# Patient Record
Sex: MALE | Race: WHITE | NOT HISPANIC OR LATINO | Employment: FULL TIME | URBAN - METROPOLITAN AREA
[De-identification: names, ages, dates, MRNs, and addresses within clinical notes are randomized per-mention and may not be internally consistent; named-entity substitution may affect disease eponyms.]

---

## 2023-11-16 ENCOUNTER — LAB (OUTPATIENT)
Dept: LAB | Facility: HOSPITAL | Age: 53
End: 2023-11-16
Payer: COMMERCIAL

## 2023-11-16 ENCOUNTER — OFFICE VISIT (OUTPATIENT)
Dept: FAMILY MEDICINE CLINIC | Facility: CLINIC | Age: 53
End: 2023-11-16
Payer: COMMERCIAL

## 2023-11-16 VITALS
HEIGHT: 73 IN | BODY MASS INDEX: 30.59 KG/M2 | OXYGEN SATURATION: 97 % | DIASTOLIC BLOOD PRESSURE: 79 MMHG | TEMPERATURE: 98.2 F | HEART RATE: 72 BPM | SYSTOLIC BLOOD PRESSURE: 128 MMHG | WEIGHT: 230.8 LBS

## 2023-11-16 DIAGNOSIS — Z00.00 ANNUAL PHYSICAL EXAM: ICD-10-CM

## 2023-11-16 DIAGNOSIS — E55.9 VITAMIN D DEFICIENCY: ICD-10-CM

## 2023-11-16 DIAGNOSIS — Z12.5 SCREENING FOR MALIGNANT NEOPLASM OF PROSTATE: ICD-10-CM

## 2023-11-16 DIAGNOSIS — R53.82 CHRONIC FATIGUE: ICD-10-CM

## 2023-11-16 DIAGNOSIS — Z00.00 ENCOUNTER FOR MEDICAL EXAMINATION TO ESTABLISH CARE: Primary | ICD-10-CM

## 2023-11-16 DIAGNOSIS — R41.3 MEMORY CHANGE: ICD-10-CM

## 2023-11-16 LAB
25(OH)D3 SERPL-MCNC: 28.1 NG/ML (ref 30–100)
ALBUMIN SERPL-MCNC: 4.2 G/DL (ref 3.5–5.2)
ALBUMIN/GLOB SERPL: 1.7 G/DL
ALP SERPL-CCNC: 54 U/L (ref 39–117)
ALT SERPL W P-5'-P-CCNC: 20 U/L (ref 1–41)
ANION GAP SERPL CALCULATED.3IONS-SCNC: 9.5 MMOL/L (ref 5–15)
ANISOCYTOSIS BLD QL: ABNORMAL
AST SERPL-CCNC: 26 U/L (ref 1–40)
BILIRUB SERPL-MCNC: 0.3 MG/DL (ref 0–1.2)
BUN SERPL-MCNC: 22 MG/DL (ref 6–20)
BUN/CREAT SERPL: 22 (ref 7–25)
CALCIUM SPEC-SCNC: 9.3 MG/DL (ref 8.6–10.5)
CHLORIDE SERPL-SCNC: 104 MMOL/L (ref 98–107)
CHOLEST SERPL-MCNC: 137 MG/DL (ref 0–200)
CO2 SERPL-SCNC: 23.5 MMOL/L (ref 22–29)
CREAT SERPL-MCNC: 1 MG/DL (ref 0.76–1.27)
DEPRECATED RDW RBC AUTO: 38.3 FL (ref 37–54)
EGFRCR SERPLBLD CKD-EPI 2021: 88.9 ML/MIN/1.73
EOSINOPHIL # BLD MANUAL: 0.5 10*3/MM3 (ref 0–0.4)
EOSINOPHIL NFR BLD MANUAL: 3 % (ref 0.3–6.2)
ERYTHROCYTE [DISTWIDTH] IN BLOOD BY AUTOMATED COUNT: 14.7 % (ref 12.3–15.4)
GLOBULIN UR ELPH-MCNC: 2.5 GM/DL
GLUCOSE SERPL-MCNC: 98 MG/DL (ref 65–99)
HCT VFR BLD AUTO: 38.4 % (ref 37.5–51)
HDLC SERPL-MCNC: 37 MG/DL (ref 40–60)
HGB BLD-MCNC: 11.8 G/DL (ref 13–17.7)
HIV 1+2 AB+HIV1 P24 AG SERPL QL IA: NORMAL
IRON 24H UR-MRATE: 24 MCG/DL (ref 59–158)
LDLC SERPL CALC-MCNC: 87 MG/DL (ref 0–100)
LDLC/HDLC SERPL: 2.37 {RATIO}
LYMPHOCYTES # BLD MANUAL: 9.63 10*3/MM3 (ref 0.7–3.1)
LYMPHOCYTES NFR BLD MANUAL: 4 % (ref 5–12)
MCH RBC QN AUTO: 22.3 PG (ref 26.6–33)
MCHC RBC AUTO-ENTMCNC: 30.7 G/DL (ref 31.5–35.7)
MCV RBC AUTO: 72.7 FL (ref 79–97)
MICROCYTES BLD QL: ABNORMAL
MONOCYTES # BLD: 0.66 10*3/MM3 (ref 0.1–0.9)
NEUTROPHILS # BLD AUTO: 5.81 10*3/MM3 (ref 1.7–7)
NEUTROPHILS NFR BLD MANUAL: 35 % (ref 42.7–76)
OVALOCYTES BLD QL SMEAR: ABNORMAL
PLAT MORPH BLD: NORMAL
PLATELET # BLD AUTO: 246 10*3/MM3 (ref 140–450)
PMV BLD AUTO: 10.6 FL (ref 6–12)
POIKILOCYTOSIS BLD QL SMEAR: ABNORMAL
POTASSIUM SERPL-SCNC: 4.2 MMOL/L (ref 3.5–5.2)
PROT SERPL-MCNC: 6.7 G/DL (ref 6–8.5)
RBC # BLD AUTO: 5.28 10*6/MM3 (ref 4.14–5.8)
SODIUM SERPL-SCNC: 137 MMOL/L (ref 136–145)
SPHEROCYTES BLD QL SMEAR: ABNORMAL
TRIGL SERPL-MCNC: 62 MG/DL (ref 0–150)
TSH SERPL DL<=0.05 MIU/L-ACNC: 1.65 UIU/ML (ref 0.27–4.2)
VARIANT LYMPHS NFR BLD MANUAL: 2 % (ref 0–5)
VARIANT LYMPHS NFR BLD MANUAL: 56 % (ref 19.6–45.3)
VIT B12 BLD-MCNC: 528 PG/ML (ref 211–946)
VLDLC SERPL-MCNC: 13 MG/DL (ref 5–40)
WBC MORPH BLD: NORMAL
WBC NRBC COR # BLD AUTO: 16.61 10*3/MM3 (ref 3.4–10.8)

## 2023-11-16 PROCEDURE — 84403 ASSAY OF TOTAL TESTOSTERONE: CPT

## 2023-11-16 PROCEDURE — 80061 LIPID PANEL: CPT

## 2023-11-16 PROCEDURE — G0432 EIA HIV-1/HIV-2 SCREEN: HCPCS

## 2023-11-16 PROCEDURE — 80050 GENERAL HEALTH PANEL: CPT

## 2023-11-16 PROCEDURE — 82306 VITAMIN D 25 HYDROXY: CPT

## 2023-11-16 PROCEDURE — 84402 ASSAY OF FREE TESTOSTERONE: CPT

## 2023-11-16 PROCEDURE — G0103 PSA SCREENING: HCPCS

## 2023-11-16 PROCEDURE — 82607 VITAMIN B-12: CPT

## 2023-11-16 PROCEDURE — 83540 ASSAY OF IRON: CPT

## 2023-11-16 PROCEDURE — 86592 SYPHILIS TEST NON-TREP QUAL: CPT

## 2023-11-16 PROCEDURE — 36415 COLL VENOUS BLD VENIPUNCTURE: CPT

## 2023-11-16 NOTE — PROGRESS NOTES
Follow Up Office Note     Patient Name: Kojo Gentile  : 1970   MRN: [id number]     Chief Complaint:    Chief Complaint   Patient presents with    Establish Care    Memory Loss     Per patient he is having memory loss.    Annual Exam       History of Present Illness: Kojo Gentile is a 53 y.o. male who presents today accompanied by his significant other to establish care with a new PCP and for annual physical exam.    Patient complaining of significant memory changes over the past month.  His significant other states that she has noticed patient seeming confused at times or in a daze and asking her the same questions repeatedly.  Patient states that he will lose things and sometimes get lost while driving.  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  Patient denies previous health issues.  He states he has been very healthy all of his life.  There is been no recent trauma or injury.  Patient states he has had previous concussions from horse abutting him in the head.  Patient and partner are very concerned about patient's memory changes.  When questioned patient's significant other stated that patient was sick with an acute illness 4 to 6 weeks ago where he was in bed for 3 days and had a high fever.  Patient did not go to the doctor.  He was not tested for COVID-19.    Patient also concerned about unexplained weight loss.  He states that he has a good appetite and eats large portions but has lost 20 pounds over the past month.      Subjective      I have reviewed and the following portions of the patient's history were updated as appropriate: past family history, past medical history, past social history, past surgical history and problem list.    Review of Systems:   Review of Systems   Constitutional:  Positive for activity change, fatigue and unexpected weight change. Negative for appetite change, chills and diaphoresis.   HENT:  "Negative.     Eyes: Negative.    Respiratory: Negative.     Cardiovascular:  Negative for chest pain, palpitations and leg swelling.   Gastrointestinal: Negative.    Genitourinary: Negative.    Musculoskeletal: Negative.    Skin: Negative.    Neurological:  Negative for dizziness, tremors, seizures, syncope, facial asymmetry, speech difficulty, weakness, light-headedness, numbness and headaches.        Past Medical History: History reviewed. No pertinent past medical history.      Medications:   No current outpatient medications on file.    Allergies:   No Known Allergies      Objective     Physical Exam:  Vital Signs:   Vitals:    11/16/23 0819   BP: 128/79   Pulse: 72   Temp: 98.2 °F (36.8 °C)   TempSrc: Infrared   SpO2: 97%   Weight: 105 kg (230 lb 12.8 oz)   Height: 185.4 cm (73\")   PainSc: 0-No pain     Body mass index is 30.45 kg/m².   BMI is >= 30 and <35. (Class 1 Obesity). The following options were offered after discussion;: information on healthy weight added to patient's after visit summary .     Physical Exam  Vitals and nursing note reviewed.   Constitutional:       General: He is not in acute distress.     Appearance: Normal appearance. He is well-developed. He is not ill-appearing, toxic-appearing or diaphoretic.   HENT:      Head: Normocephalic and atraumatic.      Right Ear: Tympanic membrane normal.      Left Ear: Tympanic membrane normal.      Nose: Nose normal.      Mouth/Throat:      Mouth: Mucous membranes are moist.      Pharynx: Oropharynx is clear.   Eyes:      Pupils: Pupils are equal, round, and reactive to light.   Cardiovascular:      Rate and Rhythm: Normal rate and regular rhythm.      Heart sounds: Normal heart sounds, S1 normal and S2 normal. No murmur heard.  Pulmonary:      Effort: Pulmonary effort is normal. No respiratory distress.      Breath sounds: Normal breath sounds. No stridor. No wheezing.   Abdominal:      Palpations: Abdomen is soft.      Tenderness: There is no " abdominal tenderness.   Musculoskeletal:      Cervical back: Normal range of motion and neck supple.      Right lower leg: No edema.      Left lower leg: No edema.   Lymphadenopathy:      Cervical: No cervical adenopathy.   Skin:     General: Skin is warm and dry.   Neurological:      General: No focal deficit present.      Mental Status: He is alert and oriented to person, place, and time.   Psychiatric:         Attention and Perception: Attention and perception normal.         Mood and Affect: Mood normal.         Speech: Speech normal.         Behavior: Behavior normal. Behavior is cooperative.         Thought Content: Thought content normal.         Cognition and Memory: Memory is impaired. He exhibits impaired recent memory.         Judgment: Judgment normal.         Assessment / Plan      Assessment/Plan:   Diagnoses and all orders for this visit:    1. Encounter for medical examination to establish care (Primary)    2. Annual physical exam  -     Comprehensive Metabolic Panel; Future  -     CBC Auto Differential; Future  -     Lipid Panel; Future  -     TSH; Future  -     HIV-1 / O / 2 Ag / Antibody; Future  -     Urinalysis With Culture If Indicated - Urine, Clean Catch; Future    3. Memory change  Assessment & Plan:  Newly identified problem of uncertain etiology.  The rapid onset of symptoms within 4-week period is concerning.  Plan to obtain laboratory studies and diagnostic imaging.  Further recommendation after results evaluation.  Plan to refer to neurology for further evaluation and management.    ACE III MMSE    ATTENTION  What is the year: correct  What is the month of the year: correct  What is the day of the week?: correct  What is the date?: incorrect  MEMORY  Repeat address three times, only score third attempt: Chava Pena [address] [place], [place]: 4  HOW MANY ANIMALS DID THE PATIENT NAME  Verbal Fluency -- Animal Names (0-25): 11-13  CLOCK DRAWING  Clock Drawing: Clock  Hands  MEMORY RECALL  Tell me what you remember about that name and address we were repeating at the beginning: 3  ACE TOTAL SCORE  Total ACE Score - <25/30 strongly suggests cognitive impairment; <21/30 almost certainly shows dementia: 16    Orders:  -     TSH; Future  -     Testosterone, Free, Total; Future  -     RPR; Future  -     CT Head Without Contrast; Future  -     Ambulatory Referral to Neurology    4. Chronic fatigue  -     TSH; Future  -     Testosterone, Free, Total; Future  -     Vitamin B12; Future  -     Iron; Future    5. Vitamin D deficiency  -     Vitamin D,25-Hydroxy; Future    6. Screening for malignant neoplasm of prostate        -     PSA     The patient is here for a health maintenance visit.  Currently, the patient consumes a mostly healthy diet and has an adequate exercise regimen. Screening lab work is ordered.  Immunizations are declined today and vaccine education is provided.  Advice and education is given regarding nutrition, aerobic exercise, routine dental evaluations, routine eye exams, reproductive health, cardiovascular risk reduction, sunscreen use, self skin examination (annual dermatology evaluations) and seat belt use (general overall safety).  Further recommendations after lab evaluation.  Annual wellness evaluations recommended.     Follow Up:   PRN and at next scheduled appointment(s) with PCP.    Discussed the nature of the medical condition(s) risks, complications, implications, management, safe and proper use of medications. Encouraged medication compliance, and keeping scheduled follow up appointments with me and any other providers.      RTC if symptoms fail to improve, to ER if symptoms worsen.        *Dictated Utilizing Dragon Dictation   Please note that portions of this note were completed with a voice recognition program.   Part of this note may be an electronic transcription/translation of spoken language to printed text using the Dragon Dictation System.  Spelling and/or grammatical errors may exist despite efforts at proofreading.      NOTE TO PATIENT: The 21st Century Cures Act makes medical notes like these available to patients in the interest of transparency. However, be advised this is a medical document. It is intended as peer to peer communication. It is written in medical language and may contain abbreviations or verbiage that are unfamiliar. It may appear blunt or direct. Medical documents are intended to carry relevant information, facts as evident, and the clinical opinion of the practitioner.      ANGEL Stanley  [facility] [place]

## 2023-11-17 ENCOUNTER — TELEPHONE (OUTPATIENT)
Dept: FAMILY MEDICINE CLINIC | Facility: CLINIC | Age: 53
End: 2023-11-17
Payer: COMMERCIAL

## 2023-11-17 DIAGNOSIS — R53.82 CHRONIC FATIGUE: ICD-10-CM

## 2023-11-17 DIAGNOSIS — D72.820 LYMPHOCYTOSIS: ICD-10-CM

## 2023-11-17 DIAGNOSIS — R41.3 MEMORY CHANGE: Primary | ICD-10-CM

## 2023-11-17 LAB
PSA SERPL-MCNC: 2.02 NG/ML (ref 0–4)
RPR SER QL: NORMAL

## 2023-11-17 NOTE — TELEPHONE ENCOUNTER
Patient advised as provider requested.           Per Izzy-Please call patient. His white blood cell count was elevated. In particular the lymphocytes. These are usually elevated in viral infections. I consulted with Dr. Pena about this and he advised patient come to lab today for more labs.

## 2023-11-17 NOTE — ASSESSMENT & PLAN NOTE
Newly identified problem of uncertain etiology.  The rapid onset of symptoms within 4-week period is concerning.  Plan to obtain laboratory studies and diagnostic imaging.  Further recommendation after results evaluation.  Plan to refer to neurology for further evaluation and management.

## 2023-11-20 ENCOUNTER — LAB (OUTPATIENT)
Dept: LAB | Facility: HOSPITAL | Age: 53
End: 2023-11-20
Payer: COMMERCIAL

## 2023-11-20 DIAGNOSIS — R53.82 CHRONIC FATIGUE: ICD-10-CM

## 2023-11-20 DIAGNOSIS — Z00.00 ANNUAL PHYSICAL EXAM: ICD-10-CM

## 2023-11-20 DIAGNOSIS — D72.820 LYMPHOCYTOSIS: ICD-10-CM

## 2023-11-20 DIAGNOSIS — R41.3 MEMORY CHANGE: ICD-10-CM

## 2023-11-20 LAB
ANISOCYTOSIS BLD QL: ABNORMAL
BASOPHILS # BLD MANUAL: 0.15 10*3/MM3 (ref 0–0.2)
BASOPHILS NFR BLD MANUAL: 1 % (ref 0–1.5)
DEPRECATED RDW RBC AUTO: 35.8 FL (ref 37–54)
EOSINOPHIL # BLD MANUAL: 0.29 10*3/MM3 (ref 0–0.4)
EOSINOPHIL NFR BLD MANUAL: 2 % (ref 0.3–6.2)
ERYTHROCYTE [DISTWIDTH] IN BLOOD BY AUTOMATED COUNT: 14.3 % (ref 12.3–15.4)
HCT VFR BLD AUTO: 38.3 % (ref 37.5–51)
HGB BLD-MCNC: 11.7 G/DL (ref 13–17.7)
LYMPHOCYTES # BLD MANUAL: 8.93 10*3/MM3 (ref 0.7–3.1)
LYMPHOCYTES NFR BLD MANUAL: 8.1 % (ref 5–12)
MCH RBC QN AUTO: 21.9 PG (ref 26.6–33)
MCHC RBC AUTO-ENTMCNC: 30.5 G/DL (ref 31.5–35.7)
MCV RBC AUTO: 71.6 FL (ref 79–97)
MICROCYTES BLD QL: ABNORMAL
MONOCYTES # BLD: 1.19 10*3/MM3 (ref 0.1–0.9)
NEUTROPHILS # BLD AUTO: 4.17 10*3/MM3 (ref 1.7–7)
NEUTROPHILS NFR BLD MANUAL: 28.3 % (ref 42.7–76)
NRBC BLD AUTO-RTO: 0 /100 WBC (ref 0–0.2)
PATHOLOGY REVIEW: YES
PLAT MORPH BLD: NORMAL
PLATELET # BLD AUTO: 259 10*3/MM3 (ref 140–450)
PMV BLD AUTO: 10.4 FL (ref 6–12)
RBC # BLD AUTO: 5.35 10*6/MM3 (ref 4.14–5.8)
SMUDGE CELLS BLD QL SMEAR: ABNORMAL
VARIANT LYMPHS NFR BLD MANUAL: 3 % (ref 0–5)
VARIANT LYMPHS NFR BLD MANUAL: 57.6 % (ref 19.6–45.3)
WBC NRBC COR # BLD AUTO: 15.16 10*3/MM3 (ref 3.4–10.8)

## 2023-11-20 PROCEDURE — 85025 COMPLETE CBC W/AUTO DIFF WBC: CPT

## 2023-11-20 PROCEDURE — 84155 ASSAY OF PROTEIN SERUM: CPT

## 2023-11-20 PROCEDURE — 85007 BL SMEAR W/DIFF WBC COUNT: CPT

## 2023-11-20 PROCEDURE — 84425 ASSAY OF VITAMIN B-1: CPT

## 2023-11-20 PROCEDURE — 86334 IMMUNOFIX E-PHORESIS SERUM: CPT

## 2023-11-20 PROCEDURE — 82784 ASSAY IGA/IGD/IGG/IGM EACH: CPT

## 2023-11-20 PROCEDURE — 84165 PROTEIN E-PHORESIS SERUM: CPT

## 2023-11-21 ENCOUNTER — DOCUMENTATION (OUTPATIENT)
Dept: FAMILY MEDICINE CLINIC | Facility: CLINIC | Age: 53
End: 2023-11-21
Payer: COMMERCIAL

## 2023-11-21 LAB
ALBUMIN SERPL ELPH-MCNC: 3.5 G/DL (ref 2.9–4.4)
ALBUMIN/GLOB SERPL: 1.2 {RATIO} (ref 0.7–1.7)
ALPHA1 GLOB SERPL ELPH-MCNC: 0.3 G/DL (ref 0–0.4)
ALPHA2 GLOB SERPL ELPH-MCNC: 0.7 G/DL (ref 0.4–1)
B-GLOBULIN SERPL ELPH-MCNC: 1 G/DL (ref 0.7–1.3)
CYTO UR: NORMAL
GAMMA GLOB SERPL ELPH-MCNC: 1 G/DL (ref 0.4–1.8)
GLOBULIN SER-MCNC: 3 G/DL (ref 2.2–3.9)
IGA SERPL-MCNC: 141 MG/DL (ref 90–386)
IGG SERPL-MCNC: 1021 MG/DL (ref 603–1613)
IGM SERPL-MCNC: 63 MG/DL (ref 20–172)
INTERPRETATION SERPL IEP-IMP: NORMAL
LAB AP CASE REPORT: NORMAL
LABORATORY COMMENT REPORT: NORMAL
M PROTEIN SERPL ELPH-MCNC: NORMAL G/DL
PATH REPORT.FINAL DX SPEC: NORMAL
PROT SERPL-MCNC: 6.5 G/DL (ref 6–8.5)
TESTOST FREE SERPL-MCNC: 4 PG/ML (ref 7.2–24)
TESTOST SERPL-MCNC: 545 NG/DL (ref 264–916)

## 2023-11-22 DIAGNOSIS — R41.3 MEMORY CHANGE: ICD-10-CM

## 2023-11-22 DIAGNOSIS — D72.820 LYMPHOCYTOSIS: Primary | ICD-10-CM

## 2023-11-22 DIAGNOSIS — R53.82 CHRONIC FATIGUE: ICD-10-CM

## 2023-11-22 LAB — VIT B1 BLD-SCNC: 132.5 NMOL/L (ref 66.5–200)

## 2023-11-27 ENCOUNTER — CONSULT (OUTPATIENT)
Dept: ONCOLOGY | Facility: CLINIC | Age: 53
End: 2023-11-27
Payer: COMMERCIAL

## 2023-11-27 ENCOUNTER — LAB (OUTPATIENT)
Dept: LAB | Facility: HOSPITAL | Age: 53
End: 2023-11-27
Payer: COMMERCIAL

## 2023-11-27 VITALS
TEMPERATURE: 98 F | HEART RATE: 71 BPM | HEIGHT: 73 IN | BODY MASS INDEX: 30.62 KG/M2 | DIASTOLIC BLOOD PRESSURE: 82 MMHG | RESPIRATION RATE: 14 BRPM | SYSTOLIC BLOOD PRESSURE: 138 MMHG | WEIGHT: 231 LBS | OXYGEN SATURATION: 99 %

## 2023-11-27 DIAGNOSIS — D72.820 LYMPHOCYTOSIS: ICD-10-CM

## 2023-11-27 DIAGNOSIS — D72.820 LYMPHOCYTOSIS: Primary | ICD-10-CM

## 2023-11-27 LAB
BASOPHILS # BLD AUTO: 0.08 10*3/MM3 (ref 0–0.2)
BASOPHILS NFR BLD AUTO: 0.5 % (ref 0–1.5)
DEPRECATED RDW RBC AUTO: 40 FL (ref 37–54)
EOSINOPHIL # BLD AUTO: 0.16 10*3/MM3 (ref 0–0.4)
EOSINOPHIL NFR BLD AUTO: 1.1 % (ref 0.3–6.2)
ERYTHROCYTE [DISTWIDTH] IN BLOOD BY AUTOMATED COUNT: 15 % (ref 12.3–15.4)
FERRITIN SERPL-MCNC: 8.23 NG/ML (ref 30–400)
FOLATE SERPL-MCNC: 7.39 NG/ML (ref 4.78–24.2)
HCT VFR BLD AUTO: 36 % (ref 37.5–51)
HGB BLD-MCNC: 10.9 G/DL (ref 13–17.7)
IMM GRANULOCYTES # BLD AUTO: 0.02 10*3/MM3 (ref 0–0.05)
IMM GRANULOCYTES NFR BLD AUTO: 0.1 % (ref 0–0.5)
IRON 24H UR-MRATE: 20 MCG/DL (ref 59–158)
IRON SATN MFR SERPL: 4 % (ref 20–50)
LDH SERPL-CCNC: 180 U/L (ref 135–225)
LYMPHOCYTES # BLD AUTO: 8.67 10*3/MM3 (ref 0.7–3.1)
LYMPHOCYTES NFR BLD AUTO: 57 % (ref 19.6–45.3)
MCH RBC QN AUTO: 22.1 PG (ref 26.6–33)
MCHC RBC AUTO-ENTMCNC: 30.3 G/DL (ref 31.5–35.7)
MCV RBC AUTO: 73 FL (ref 79–97)
MONOCYTES # BLD AUTO: 1.49 10*3/MM3 (ref 0.1–0.9)
MONOCYTES NFR BLD AUTO: 9.8 % (ref 5–12)
NEUTROPHILS NFR BLD AUTO: 31.5 % (ref 42.7–76)
NEUTROPHILS NFR BLD AUTO: 4.78 10*3/MM3 (ref 1.7–7)
PLATELET # BLD AUTO: 239 10*3/MM3 (ref 140–450)
PMV BLD AUTO: 10 FL (ref 6–12)
RBC # BLD AUTO: 4.93 10*6/MM3 (ref 4.14–5.8)
TIBC SERPL-MCNC: 508 MCG/DL (ref 298–536)
TRANSFERRIN SERPL-MCNC: 341 MG/DL (ref 200–360)
VIT B12 BLD-MCNC: 547 PG/ML (ref 211–946)
WBC NRBC COR # BLD AUTO: 15.2 10*3/MM3 (ref 3.4–10.8)

## 2023-11-27 PROCEDURE — 82746 ASSAY OF FOLIC ACID SERUM: CPT

## 2023-11-27 PROCEDURE — 36415 COLL VENOUS BLD VENIPUNCTURE: CPT

## 2023-11-27 PROCEDURE — 83540 ASSAY OF IRON: CPT

## 2023-11-27 PROCEDURE — 82728 ASSAY OF FERRITIN: CPT

## 2023-11-27 PROCEDURE — 85025 COMPLETE CBC W/AUTO DIFF WBC: CPT

## 2023-11-27 PROCEDURE — 82607 VITAMIN B-12: CPT

## 2023-11-27 PROCEDURE — 84466 ASSAY OF TRANSFERRIN: CPT

## 2023-11-27 PROCEDURE — 83615 LACTATE (LD) (LDH) ENZYME: CPT

## 2023-11-27 PROCEDURE — 99204 OFFICE O/P NEW MOD 45 MIN: CPT | Performed by: INTERNAL MEDICINE

## 2023-11-27 NOTE — PROGRESS NOTES
DATE OF CONSULTATION: 11/27/2023    REFERRING PHYSICIAN: Izzy Burk APRN    Dear Izzy Emery APRN  Thank you for asking for my medical advice on this patient. I saw him in the  [place] office on 11/27/2023    REASON FOR CONSULTATION: Lymphocytosis    PROBLEM LIST:   1.  Lymphocytosis:  A.  Presented with fatigue, weakness unexplained weight loss September 2023  B.  CBC checked twice November 2023 revealed white cells of 17,000 and absolute lymphocyte count more than 8000.  2.  Microcytic anemia  3.  Fatigue    HISTORY OF PRESENT ILLNESS: The patient is a very pleasant 53 y.o.  male   who was in his usual state of health until September 2023.  The patient present with fatigue.  Never had this problem before.  This has been gradually getting worse.  2-month ago he had shortness of breath fever and cough.  Never been tested for COVID.  He also had poor appetite with approximately 30 pounds weight loss over the last 3 months.  His fever shortness of breath and poor appetite has always resolved however he continues to have fatigue.  He had blood work done twice that confirmed lymphocytosis.  The patient was referred to me for further recommendations.    SUBJECTIVE: When I saw the patient today he is here with his fiancée.  He is complaining of fatigue.  Denies any bleedings no skin bruises.    Review of Systems   Constitutional:  Positive for fatigue and unexpected weight change.   Eyes: Negative.    Respiratory: Negative.     Cardiovascular: Negative.    Gastrointestinal: Negative.    Endocrine: Negative.    Genitourinary: Negative.    Musculoskeletal: Negative.    Allergic/Immunologic: Negative.    Neurological: Negative.    Hematological: Negative.    Psychiatric/Behavioral:  The patient is nervous/anxious.        No past medical history on file.    Social History     Socioeconomic History    Marital status: Significant Other   Tobacco Use    Smoking status: Never    Smokeless tobacco: Never  "  Vaping Use    Vaping Use: Never used   Substance and Sexual Activity    Alcohol use: Not Currently     Alcohol/week: 1.0 standard drink of alcohol     Types: 1 Shots of liquor per week    Drug use: Yes     Types: Marijuana     Comment: per patient he was a teenager    Sexual activity: Yes     Birth control/protection: Hysterectomy       Family History   Problem Relation Age of Onset    Dementia Mother     Heart disease Father        No past surgical history on file.    No Known Allergies     No current outpatient medications on file.    PHYSICAL EXAMINATION:   /82   Pulse 71   Temp 98 °F (36.7 °C) (Infrared)   Resp 14   Ht 185.4 cm (72.99\")   Wt 105 kg (231 lb)   SpO2 99%   BMI 30.48 kg/m²   Pain Score    11/27/23 1311   PainSc: 0-No pain      ECOG score: 0            ECOG Performance Status: 1 - Symptomatic but completely ambulatory  General Appearance:  alert, cooperative, no apparent distress and appears stated age   Neurologic/Psychiatric: A&O x 3, gait steady, appropriate affect, strength 5/5 in all muscle groups   HEENT:  Normocephalic, without obvious abnormality, mucous membranes moist   Neck: Supple, symmetrical, trachea midline, no adenopathy;  No thyromegaly, masses, or tenderness   Lungs:   Clear to auscultation bilaterally; respirations regular, even, and unlabored bilaterally   Heart:  Regular rate and rhythm, no murmurs appreciated   Abdomen:   Soft, non-tender, non-distended, and no organomegaly   Lymph nodes: No cervical, supraclavicular, inguinal or axillary adenopathy noted   Extremities: Normal, atraumatic; no clubbing, cyanosis, or edema    Skin: No rashes, ulcers, or suspicious lesions noted       Lab on 11/20/2023   Component Date Value Ref Range Status    Pathology Review 11/20/2023 Yes   Final    Vitamin B1, Whole Blood 11/20/2023 132.5  66.5 - 200.0 nmol/L Final    IgG 11/20/2023 1021  603 - 1613 mg/dL Final    IgA 11/20/2023 141  90 - 386 mg/dL Final    IgM 11/20/2023 63  20 " - 172 mg/dL Final    Total Protein 11/20/2023 6.5  6.0 - 8.5 g/dL Final    Albumin 11/20/2023 3.5  2.9 - 4.4 g/dL Final    Alpha-1-Globulin 11/20/2023 0.3  0.0 - 0.4 g/dL Final    Alpha-2-Globulin 11/20/2023 0.7  0.4 - 1.0 g/dL Final    Beta Globulin 11/20/2023 1.0  0.7 - 1.3 g/dL Final    Gamma Globulin 11/20/2023 1.0  0.4 - 1.8 g/dL Final    M-Adria 11/20/2023 Not Observed  Not Observed g/dL Final    Globulin 11/20/2023 3.0  2.2 - 3.9 g/dL Final    A/G Ratio 11/20/2023 1.2  0.7 - 1.7 Final    Immunofixation Reflex, Serum 11/20/2023 Comment:   Final    Presence of monoclonal protein is unclear at this time. Suggest  repeat in 3 to 6 months if clinically indicated.    Please note 11/20/2023 Comment   Final    Protein electrophoresis scan will follow via computer, mail, or   delivery.    WBC 11/20/2023 15.16 (H)  3.40 - 10.80 10*3/mm3 Final    RBC 11/20/2023 5.35  4.14 - 5.80 10*6/mm3 Final    Hemoglobin 11/20/2023 11.7 (L)  13.0 - 17.7 g/dL Final    Hematocrit 11/20/2023 38.3  37.5 - 51.0 % Final    MCV 11/20/2023 71.6 (L)  79.0 - 97.0 fL Final    MCH 11/20/2023 21.9 (L)  26.6 - 33.0 pg Final    MCHC 11/20/2023 30.5 (L)  31.5 - 35.7 g/dL Final    RDW 11/20/2023 14.3  12.3 - 15.4 % Final    RDW-SD 11/20/2023 35.8 (L)  37.0 - 54.0 fl Final    MPV 11/20/2023 10.4  6.0 - 12.0 fL Final    Platelets 11/20/2023 259  140 - 450 10*3/mm3 Final    nRBC 11/20/2023 0.0  0.0 - 0.2 /100 WBC Final    Neutrophil % 11/20/2023 28.3 (L)  42.7 - 76.0 % Final    Lymphocyte % 11/20/2023 57.6 (H)  19.6 - 45.3 % Final    Monocyte % 11/20/2023 8.1  5.0 - 12.0 % Final    Eosinophil % 11/20/2023 2.0  0.3 - 6.2 % Final    Basophil % 11/20/2023 1.0  0.0 - 1.5 % Final    Atypical Lymphocyte % 11/20/2023 3.0  0.0 - 5.0 % Final    Neutrophils Absolute 11/20/2023 4.17  1.70 - 7.00 10*3/mm3 Final    Lymphocytes Absolute 11/20/2023 8.93 (H)  0.70 - 3.10 10*3/mm3 Final    Monocytes Absolute 11/20/2023 1.19 (H)  0.10 - 0.90 10*3/mm3 Final     Eosinophils Absolute 11/20/2023 0.29  0.00 - 0.40 10*3/mm3 Final    Basophils Absolute 11/20/2023 0.15  0.00 - 0.20 10*3/mm3 Final    Anisocytosis 11/20/2023 Slight/1+  None Seen Final    Microcytes 11/20/2023 Slight/1+  None Seen Final    Smudge Cells 11/20/2023 Mod/2+  None Seen Final    Platelet Morphology 11/20/2023 Normal  Normal Final    Final Diagnosis 11/20/2023    Final                    Value:This result contains rich text formatting which cannot be displayed here.    Microscopic Description 11/20/2023    Final                    Value:This result contains rich text formatting which cannot be displayed here.    Case Report 11/20/2023    Final                    Value:Surgical Pathology Report                         Case: [id number]                                  Authorizing Provider:  Izzy Burk APRN     Collected:           11/20/2023 09:07 AM          Ordering Location:     [facility]   Received:            11/20/2023 06:44 PM                                 [employer] [facility]                                                     Pathologist:           Barrett Schulte MD                                                         Specimen:    Blood, Venous Line, Peripheral blood smear                                                Lab on 11/16/2023   Component Date Value Ref Range Status    Glucose 11/16/2023 98  65 - 99 mg/dL Final    BUN 11/16/2023 22 (H)  6 - 20 mg/dL Final    Creatinine 11/16/2023 1.00  0.76 - 1.27 mg/dL Final    Sodium 11/16/2023 137  136 - 145 mmol/L Final    Potassium 11/16/2023 4.2  3.5 - 5.2 mmol/L Final    Chloride 11/16/2023 104  98 - 107 mmol/L Final    CO2 11/16/2023 23.5  22.0 - 29.0 mmol/L Final    Calcium 11/16/2023 9.3  8.6 - 10.5 mg/dL Final    Total Protein 11/16/2023 6.7  6.0 - 8.5 g/dL Final    Albumin 11/16/2023 4.2  3.5 - 5.2 g/dL Final    ALT (SGPT) 11/16/2023 20  1 - 41 U/L Final    AST (SGOT) 11/16/2023 26  1 - 40 U/L Final    Alkaline  Phosphatase 11/16/2023 54  39 - 117 U/L Final    Total Bilirubin 11/16/2023 0.3  0.0 - 1.2 mg/dL Final    Globulin 11/16/2023 2.5  gm/dL Final    A/G Ratio 11/16/2023 1.7  g/dL Final    BUN/Creatinine Ratio 11/16/2023 22.0  7.0 - 25.0 Final    Anion Gap 11/16/2023 9.5  5.0 - 15.0 mmol/L Final    eGFR 11/16/2023 88.9  >60.0 mL/min/1.73 Final    WBC 11/16/2023 16.61 (H)  3.40 - 10.80 10*3/mm3 Final    RBC 11/16/2023 5.28  4.14 - 5.80 10*6/mm3 Final    Hemoglobin 11/16/2023 11.8 (L)  13.0 - 17.7 g/dL Final    Hematocrit 11/16/2023 38.4  37.5 - 51.0 % Final    MCV 11/16/2023 72.7 (L)  79.0 - 97.0 fL Final    MCH 11/16/2023 22.3 (L)  26.6 - 33.0 pg Final    MCHC 11/16/2023 30.7 (L)  31.5 - 35.7 g/dL Final    RDW 11/16/2023 14.7  12.3 - 15.4 % Final    RDW-SD 11/16/2023 38.3  37.0 - 54.0 fl Final    MPV 11/16/2023 10.6  6.0 - 12.0 fL Final    Platelets 11/16/2023 246  140 - 450 10*3/mm3 Final    Total Cholesterol 11/16/2023 137  0 - 200 mg/dL Final    Triglycerides 11/16/2023 62  0 - 150 mg/dL Final    HDL Cholesterol 11/16/2023 37 (L)  40 - 60 mg/dL Final    LDL Cholesterol  11/16/2023 87  0 - 100 mg/dL Final    VLDL Cholesterol 11/16/2023 13  5 - 40 mg/dL Final    LDL/HDL Ratio 11/16/2023 2.37   Final    TSH 11/16/2023 1.650  0.270 - 4.200 uIU/mL Final    Testosterone, Total 11/16/2023 545  264 - 916 ng/dL Final    Adult male reference interval is based on a population of  healthy nonobese males (BMI <30) between 19 and 39 years old.  Negrito, et.al. JCEM 2017,102;1828-1711. PMID: [id number].    Testosterone, Free 11/16/2023 4.0 (L)  7.2 - 24.0 pg/mL Final    RPR 11/16/2023 Non-Reactive  Non-Reactive Final    HIV DUO 11/16/2023 Non-Reactive  Non-Reactive Final    Vitamin B-12 11/16/2023 528  211 - 946 pg/mL Final    Iron 11/16/2023 24 (L)  59 - 158 mcg/dL Final    25 Hydroxy, Vitamin D 11/16/2023 28.1 (L)  30.0 - 100.0 ng/ml Final    Neutrophil % 11/16/2023 35.0 (L)  42.7 - 76.0 % Final    Lymphocyte % 11/16/2023 56.0  (H)  19.6 - 45.3 % Final    Monocyte % 11/16/2023 4.0 (L)  5.0 - 12.0 % Final    Eosinophil % 11/16/2023 3.0  0.3 - 6.2 % Final    Atypical Lymphocyte % 11/16/2023 2.0  0.0 - 5.0 % Final    Neutrophils Absolute 11/16/2023 5.81  1.70 - 7.00 10*3/mm3 Final    Lymphocytes Absolute 11/16/2023 9.63 (H)  0.70 - 3.10 10*3/mm3 Final    Monocytes Absolute 11/16/2023 0.66  0.10 - 0.90 10*3/mm3 Final    Eosinophils Absolute 11/16/2023 0.50 (H)  0.00 - 0.40 10*3/mm3 Final    Anisocytosis 11/16/2023 Slight/1+  None Seen Final    Microcytes 11/16/2023 Slight/1+  None Seen Final    Ovalocytes 11/16/2023 Mod/2+  None Seen Final    Poikilocytes 11/16/2023 Mod/2+  None Seen Final    Spherocytes 11/16/2023 Slight/1+  None Seen Final    WBC Morphology 11/16/2023 Normal  Normal Final    Platelet Morphology 11/16/2023 Normal  Normal Final    PSA 11/16/2023 2.020  0.000 - 4.000 ng/mL Final        No results found.      DIAGNOSTIC DATA:   1.  Extensive patient medical records including doctor notes blood results and imaging reports reviewed by me and documented in the patient's chart.    ASSESSMENT: The patient is a very pleasant 53 y.o.  male  with leukocytosis    PLAN:     1.  Lymphocytosis:  A.  I had a long discussion today with the patient and his fiancée about his  new diagnosis of lymphocytosis. I reviewed the patient's documents including refereing provider's notes, lab results, imaging report.   B.  I explained to the patient the current abnormality is worrisome for underlying lymphoproliferative neoplasm.  C.  In order to narrow down the differential diagnosis I will proceed with the following workup: Repeat CBC with differential, peripheral blood flow cytometry, CLL FISH panel, I GVH mutation status, serum LDH.    2.  Microcytic anemia:  A.  I will check the patient's CBC iron profile and vitamin levels today.    3.  Fatigue:  A.  This could be driven by his lymphoproliferative disorder.      FOLLOW UP: 1 month to go over the  results.    Robby Panchal MD  11/27/2023

## 2023-11-28 ENCOUNTER — TELEPHONE (OUTPATIENT)
Dept: ONCOLOGY | Facility: CLINIC | Age: 53
End: 2023-11-28
Payer: COMMERCIAL

## 2023-11-28 DIAGNOSIS — K90.9 IRON MALABSORPTION: ICD-10-CM

## 2023-11-28 DIAGNOSIS — D50.9 IRON DEFICIENCY ANEMIA, UNSPECIFIED IRON DEFICIENCY ANEMIA TYPE: Primary | ICD-10-CM

## 2023-11-28 RX ORDER — SODIUM CHLORIDE 9 MG/ML
20 INJECTION, SOLUTION INTRAVENOUS ONCE
OUTPATIENT
Start: 2023-12-25

## 2023-11-28 RX ORDER — FAMOTIDINE 10 MG/ML
20 INJECTION, SOLUTION INTRAVENOUS ONCE
OUTPATIENT
Start: 2023-12-25

## 2023-11-28 RX ORDER — SODIUM CHLORIDE 9 MG/ML
20 INJECTION, SOLUTION INTRAVENOUS ONCE
OUTPATIENT
Start: 2023-12-18

## 2023-11-28 RX ORDER — DIPHENHYDRAMINE HCL 25 MG
25 CAPSULE ORAL ONCE
OUTPATIENT
Start: 2023-12-25

## 2023-11-28 RX ORDER — DIPHENHYDRAMINE HCL 25 MG
25 CAPSULE ORAL ONCE
OUTPATIENT
Start: 2023-12-18

## 2023-11-28 RX ORDER — FAMOTIDINE 10 MG/ML
20 INJECTION, SOLUTION INTRAVENOUS ONCE
OUTPATIENT
Start: 2023-12-18

## 2023-11-28 NOTE — TELEPHONE ENCOUNTER
Leila Plascencia RN called and spoke with Juliet. Advised her that Dr. Panchal wants to set patient up for IV iron same day he sees him.  I advised we may have to make appointments earlier to do that and she is ok with it.  She said ok to refer to GI as well for his JUAN J.

## 2023-11-28 NOTE — TELEPHONE ENCOUNTER
Hub staff attempted to follow warm transfer process and was unsuccessful     Caller: LEAH HAM    Relationship to patient: Emergency Contact    Best call back number: [phone]    is needing: RETURNING CALL FROM PABLO VIZCARRA TO DISCUSS LABS    SEE SIGNED ENCOUNTER DATED TODAY  11/28/23         Subjective   Patient ID: Matthew is a 40 year old male.    Chief Complaint   Patient presents with   • Follow-up     test results   • Back Pain     for a month       HPI follow-up of diabetes and dyslipidemia, stopped taking glipizide after he had couple low blood sugar reaction but he was only eating 1 meal a day.  Dull localized lower back pain for a month that increases with turning twisting and bending.  No injury.  No  or GI symptoms.    Review of Systems   Musculoskeletal: Positive for back pain.       ALLERGIES:  No Known Allergies    Current Outpatient Medications   Medication Sig Dispense Refill   • Blood Glucose Monitoring Suppl w/Device Kit Test blood sugar one time daily as directed. 1 kit 0   • Lancets 30G Misc daily. Test blood sugar one time daily as directed. 100 each 3   • blood glucose test strip Test blood sugar one time daily as directed. 100 strip 3   • Alcohol Swabs (B-D SINGLE USE SWABS REGULAR) Pads Test blood sugar one time daily as directed. 100 each 3   • atorvastatin (LIPITOR) 20 MG tablet Take 1 tablet by mouth daily. 90 tablet 1   • triamcinolone (ARISTOCORT) 0.1 % cream Apply topically 2 times daily. 80 g 1   • Continuous Blood Gluc  (FreeStyle Geovanna 14 Day [place]) Device 1 Device 3 times daily. 1 Device 0   • Continuous Blood Gluc Sensor (FreeStyle Geovanna 14 Day Sensor) Misc 2 Devices 3 times daily. Check blood sugar 3 times daily 4 each 11   • glipiZIDE (GLUCOTROL ER) 10 MG 24 hr tablet 1 tablet twice daily with breakfast and dinner 180 tablet 1     No current facility-administered medications for this visit.       Past Medical History:   Diagnosis Date   • Diabetes (CMS/HCC)        Past Surgical History:   Procedure Laterality Date   • No past surgeries         Family History   Family history unknown: Yes       Social History     Socioeconomic History   • Marital status: /Civil Union     Spouse name: Not on file   • Number of children: Not on file   • Years of  education: Not on file   • Highest education level: Not on file   Occupational History   • Not on file   Tobacco Use   • Smoking status: Current Every Day Smoker     Packs/day: 1.00     Types: Cigarettes   • Smokeless tobacco: Never Used   Vaping Use   • Vaping Use: never used   Substance and Sexual Activity   • Alcohol use: Never   • Drug use: Not Currently   • Sexual activity: Yes     Partners: Female   Other Topics Concern   • Not on file   Social History Narrative   • Not on file     Social Determinants of Health     Financial Resource Strain:    • Social Determinants: Financial Resource Strain:    Food Insecurity:    • Social Determinants: Food Insecurity:    Transportation Needs:    • Lack of Transportation (Medical):    • Lack of Transportation (Non-Medical):    Physical Activity: Inactive   • Days of Exercise per Week: 0 days   • Minutes of Exercise per Session: 0 min   Stress:    • Social Determinants: Stress:    Social Connections:    • Social Determinants: Social Connections:    Intimate Partner Violence:    • Social Determinants: Intimate Partner Violence Past Fear:    • Social Determinants: Intimate Partner Violence Current Fear:        I have personally reviewed and updated the following EPIC sections: Current medications, Allergies, Problem list, Past Medical History, Past Surgical History, Social History and Family History    Blood pressure 104/73, pulse 92, temperature 97.8 °F (36.6 °C), temperature source Temporal, resp. rate 18, height 5' 10.08\" (1.78 m), weight 77.6 kg (171 lb), SpO2 98 %.  Body mass index is 24.48 kg/m².    Physical Exam  Vitals and nursing note reviewed.   Constitutional:       General: He is not in acute distress.  Musculoskeletal:         General: No tenderness. Normal range of motion.   Neurological:      General: No focal deficit present.      Mental Status: He is alert.   Psychiatric:         Mood and Affect: Mood normal.         Behavior: Behavior normal.         Thought  Content: Thought content normal.         Judgment: Judgment normal.         Lab:    Most Recent Immunizations   Administered Date(s) Administered   • Tdap 10/16/2014       Procedures        No orders of the defined types were placed in this encounter.      Assessment   Problem List Items Addressed This Visit     None      Visit Diagnoses     Diabetic retinopathy screening    -  Primary    Type 2 diabetes mellitus without complication, with long-term current use of insulin (CMS/HCC)        Relevant Orders    SERVICE TO OPHTHALMOLOGY    GLYCOHEMOGLOBIN    Dyslipidemia        Relevant Orders    CREATINE KINASE    COMPREHENSIVE METABOLIC PANEL    LIPID PANEL WITH REFLEX        1. Diabetic retinopathy screening    2. Type 2 diabetes mellitus without complication, with long-term current use of insulin (CMS/HCC)    3. Dyslipidemia         PLAN: Noncompliant with uncontrolled diabetes, advised for 3 meals and monitor blood sugar closely.  Patient will return after fasting blood test  Educated regarding back care low back stretching and strengthening exercises.    Time Spent    Sadie Corley MD

## 2023-11-29 LAB — Lab: NORMAL

## 2023-12-01 ENCOUNTER — HOSPITAL ENCOUNTER (OUTPATIENT)
Dept: CT IMAGING | Facility: HOSPITAL | Age: 53
Discharge: HOME | End: 2023-12-01
Admitting: NURSE PRACTITIONER
Payer: COMMERCIAL

## 2023-12-01 ENCOUNTER — OFFICE VISIT (OUTPATIENT)
Dept: NEUROLOGY | Facility: CLINIC | Age: 53
End: 2023-12-01
Payer: COMMERCIAL

## 2023-12-01 VITALS
DIASTOLIC BLOOD PRESSURE: 70 MMHG | WEIGHT: 231 LBS | OXYGEN SATURATION: 97 % | HEIGHT: 73 IN | BODY MASS INDEX: 30.62 KG/M2 | HEART RATE: 85 BPM | SYSTOLIC BLOOD PRESSURE: 130 MMHG

## 2023-12-01 DIAGNOSIS — R41.3 MEMORY CHANGE: ICD-10-CM

## 2023-12-01 DIAGNOSIS — R41.3 MEMORY LOSS: Primary | ICD-10-CM

## 2023-12-01 LAB — IGVH RESULT: NORMAL

## 2023-12-01 PROCEDURE — 70450 CT HEAD/BRAIN W/O DYE: CPT

## 2023-12-01 NOTE — PROGRESS NOTES
Neuro Office Visit      Encounter Date: 2023   Patient Name: Kojo Gentile  : 1970   MRN: [id number]   PCP:  Izzy Burk APRN     Chief Complaint:    Chief Complaint   Patient presents with    Memory Loss       History of Present Illness: Kojo Gentile is a 53 y.o. male who is here today in Neurology for  memory loss.     PMH of memory change, lymphocytosis, iron deficiency anemia, and iron malabsorption.    Patient was evaluated by Izzy ORTIZ on 2023 for evaluation of memory changes that have been occurring over the past month.  His significant other reported that she had noticed patient seeming confused at times during the days and has spanier the same questions repeatedly.  He reported that visit that he will sometimes lose things and sometimes get lost while driving.  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  He denied prior health issues.  Reported that he had been very healthy all of his life.  No recent trauma or injury.  Reported that he had had concussions from horse abutting him in the head previously.  Per primary care patient had also reported that he was sick with an acute illness 4 to 6 weeks ago where he was in bed for 3 days and had a high fever, and he did not go to the doctor and was not tested for COVID-19.  He was also concerned about unexplained weight loss reporting that he had a good appetite and eats large portions but has lost approximately 20 pounds over the last month.  Memory testing (MOCA?) in primary care clinic was 16 out of 30.  Lab work from 2023 included iron which was 24, patient has since been referred to hematology, vitamin B12 528, HIV nonreactive, RPR nonreactive, TSH 1.650, CBC notable for WBC 16.61, CMP with elevated BUN, otherwise unremarkable.  Repeat CBC on 2023 was 15.20 -Per discussion with Dr. Panchal he felt that the lymphocytosis was concerning for potential  underlying lymphoproliferative neoplasm.  He has proceeded with additional workup including CLL FISH panel, I GVH mutation status, serum LDH.    CT head was performed on 12/1/2023 which per impression stated no acute intracranial abnormality, right maxillary sinus disease.  No underlying hemorrhage or midline shift.    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 He has noted symptoms since at least 2 months marked initially by forgetfulness . This has gradually improved  over time.  Of note he did have illness approximately 2 months ago with high fever in which he was bedbound for approximately 3 days, he was not tested for COVID-19 at that time.  Additional symptoms have included impairments in short term memory . There have been associated  symptoms of depression and fatigue. He notes  impairments in ADL's. He manages his finances. He continues to drive.  . He is currently residing at home.     Family History: Mother with dementia when she was older  Personal Neurological History: No personal history of seizure. He believes that he may have had a heat stroke previously   Education & Work History: Works with horses; 8th grade education  Psychological History: No previous diagnosis of anxiety or depression   Sleep Habits & History: Sleeping well  Chronic Pain: None   Hearing or Vision Impairments: None  Personal History of Cancer: Currently undergoing workup with Dr. Panchal     No visual changes, weakness, headaches, no dizziness  Reports drug use as a teenager, nothing recently, no current alcohol or drug use      Subjective      Review of Systems   Constitutional:  Positive for fatigue.   Eyes:  Negative for photophobia and visual disturbance.   Respiratory: Negative.     Cardiovascular: Negative.    Gastrointestinal: Negative.    Musculoskeletal: Negative.    Skin: Negative.   "  Allergic/Immunologic: Positive for environmental allergies.   Neurological:  Negative for dizziness, speech difficulty, weakness and headaches.        Memory loss   Psychiatric/Behavioral:  Negative for sleep disturbance.           Past Medical History: No past medical history on file.    Past Surgical History: No past surgical history on file.    Family History:   Family History   Problem Relation Age of Onset    Dementia Mother     Heart disease Father        Social History:   Social History     Socioeconomic History    Marital status:    Tobacco Use    Smoking status: Never     Passive exposure: Never    Smokeless tobacco: Never   Vaping Use    Vaping Use: Never used   Substance and Sexual Activity    Alcohol use: Not Currently     Alcohol/week: 1.0 standard drink of alcohol     Types: 1 Shots of liquor per week    Drug use: Yes     Types: Marijuana     Comment: per patient he was a teenager    Sexual activity: Yes     Birth control/protection: Hysterectomy       Medications:   No current outpatient medications on file.    Allergies:   No Known Allergies      Objective     Objective:    /70   Pulse 85   Ht 185.4 cm (72.99\")   Wt 105 kg (231 lb)   SpO2 97%   BMI 30.49 kg/m²   Body mass index is 30.49 kg/m².    Physical Exam  Vitals reviewed.   Constitutional:       Appearance: Normal appearance.   HENT:      Head: Normocephalic and atraumatic.      Mouth/Throat:      Mouth: Mucous membranes are moist.      Pharynx: Oropharynx is clear.   Pulmonary:      Effort: Pulmonary effort is normal. No respiratory distress.   Musculoskeletal:      Right lower leg: No edema.      Left lower leg: No edema.   Skin:     General: Skin is warm and dry.   Neurological:      Mental Status: He is alert.          Neurology Exam:    General apperance: NAD.     Mental status: Alert, awake and oriented to time place and person.    Fund of knowledge:  Normal.     Language and Speech: No aphasia or dysarthria.    Naming " , Repitition and Comprehension:  Can name objects, repeat a sentence and follow commands. Speech is clear and fluent with good repetition, comprehension, and naming.    Cranial Nerves:   CN II: Visual fields are full. Intact. Pupils - PERRLA  CN III, IV and VI: Extraocular movements are intact. Normal saccades.   CN V: Facial sensation is intact.   CN VII: Muscles of facial expression reveal no asymmetry. Intact.   CN VIII: Hearing is intact.   CN IX and X: Palate elevates symmetrically. Intact  CN XI: Shoulder shrug is intact.   CN XII: Tongue is midline without evidence of atrophy or fasciculation.     Motor:  Right UE muscle strength 5/5. Normal tone.     Left UE muscle strength 5/5. Normal tone.      Right LE muscle strength 5/5. Normal tone.     Left LE muscle strength 5/5. Normal tone.      Sensory: Normal light touch and vibration sensation bilaterally.    DTRs: 2+ bilaterally in upper and lower extremities.    Coordination: Normal finger-to-nose, heel to shin B/L.    Rhomberg: Negative.    Gait: Normal. No assistive device    MMSE 27/30 with 2/3 for word recall       Results:   Imaging:   CT Head Without Contrast    Result Date: 12/1/2023  Impression: 1.No acute intracranial abnormality. 2.Right maxillary sinus disease. Electronically Signed: Alessandro Crowder MD  12/1/2023 9:01 AM EST  Workstation ID: [id number]       Labs:   Lab Results   Component Value Date    GLUCOSE 98 11/16/2023    BUN 22 (H) 11/16/2023    CREATININE 1.00 11/16/2023    EGFR 88.9 11/16/2023    BCR 22.0 11/16/2023    K 4.2 11/16/2023    CO2 23.5 11/16/2023    CALCIUM 9.3 11/16/2023    PROTENTOTREF 6.5 11/20/2023    ALBUMIN 3.5 11/20/2023    BILITOT 0.3 11/16/2023    AST 26 11/16/2023    ALT 20 11/16/2023     WBC   Date Value Ref Range Status   11/27/2023 15.20 (H) 3.40 - 10.80 10*3/mm3 Final     RBC   Date Value Ref Range Status   11/27/2023 4.93 4.14 - 5.80 10*6/mm3 Final     Hemoglobin   Date Value Ref Range Status   11/27/2023 10.9 (L) 13.0  - 17.7 g/dL Final     Hematocrit   Date Value Ref Range Status   11/27/2023 36.0 (L) 37.5 - 51.0 % Final     MCV   Date Value Ref Range Status   11/27/2023 73.0 (L) 79.0 - 97.0 fL Final     MCH   Date Value Ref Range Status   11/27/2023 22.1 (L) 26.6 - 33.0 pg Final     MCHC   Date Value Ref Range Status   11/27/2023 30.3 (L) 31.5 - 35.7 g/dL Final     RDW   Date Value Ref Range Status   11/27/2023 15.0 12.3 - 15.4 % Final     RDW-SD   Date Value Ref Range Status   11/27/2023 40.0 37.0 - 54.0 fl Final     MPV   Date Value Ref Range Status   11/27/2023 10.0 6.0 - 12.0 fL Final     Platelets   Date Value Ref Range Status   11/27/2023 239 140 - 450 10*3/mm3 Final     Neutrophil %   Date Value Ref Range Status   11/27/2023 31.5 (L) 42.7 - 76.0 % Final     Lymphocyte %   Date Value Ref Range Status   11/27/2023 57.0 (H) 19.6 - 45.3 % Final     Monocyte %   Date Value Ref Range Status   11/27/2023 9.8 5.0 - 12.0 % Final     Eosinophil %   Date Value Ref Range Status   11/27/2023 1.1 0.3 - 6.2 % Final     Basophil %   Date Value Ref Range Status   11/27/2023 0.5 0.0 - 1.5 % Final     Immature Grans %   Date Value Ref Range Status   11/27/2023 0.1 0.0 - 0.5 % Final     Neutrophils, Absolute   Date Value Ref Range Status   11/27/2023 4.78 1.70 - 7.00 10*3/mm3 Final     Lymphocytes, Absolute   Date Value Ref Range Status   11/27/2023 8.67 (H) 0.70 - 3.10 10*3/mm3 Final     Monocytes, Absolute   Date Value Ref Range Status   11/27/2023 1.49 (H) 0.10 - 0.90 10*3/mm3 Final     Eosinophils, Absolute   Date Value Ref Range Status   11/27/2023 0.16 0.00 - 0.40 10*3/mm3 Final     Basophils, Absolute   Date Value Ref Range Status   11/27/2023 0.08 0.00 - 0.20 10*3/mm3 Final     Immature Grans, Absolute   Date Value Ref Range Status   11/27/2023 0.02 0.00 - 0.05 10*3/mm3 Final     nRBC   Date Value Ref Range Status   11/20/2023 0.0 0.0 - 0.2 /100 WBC Final         Assessment / Plan      Assessment/Plan:   Diagnoses and all orders for  this visit:    1. Memory loss (Primary)  -     NeuroPsych Testing; Future       Kojo Gentile is in neurology clinic to establish care for memory loss. MMSE 27/30 with 2/3 for word recall. He does report that memory has shown some improvement since initial onset of decline however still feels that he is having difficulty remembering day-to-day tasks that he historically did not have difficulty with.  I did review his CT head today with patient and Dr. Keyes, no acute abnormalities noted on scan.  Lab work per primary care included RPR which was nonreactive, TSH was normal at 1.650, he is following with Dr. Panchal for concern of potential lymphoproliferative neoplasm due to elevated WBC initially noted on routine lab work - he is currently in the process of undergoing additional testing per Dr. Panchal.  His HPI is also suspicious for potential memory changes as sequela from infection, potentially from COVID-19. I reviewed HPI with Dr. Keyes today who felt that we could consider MRI brain in approximately 3 months if we are not seeing improvement in his memory.  I have placed referral to neuropsych testing at  as well.  I offered cognitive therapy which patient did not wish to pursue at this time.  He does also report that he snores, he did not wish to undergo further evaluation by sleep medicine for snoring.  I have asked that he please contact clinic in the interim if he notes that memory is starting to decline instead of improving as he had reported in clinic today.      Patient Education:       Reviewed medications, potential side effects and signs and symptoms to report. Discussed risk versus benefits of treatment plan with patient and/or family-including medications, labs and radiology that may be ordered. Addressed questions and concerns during visit. Patient and/or family verbalized understanding and agree with plan. Instructed to call the office with any questions and report to ER with any life-threatening  symptoms.     Follow Up:   Return in about 3 months (around 3/1/2024).    I spent 30  minutes face to face with the patient. I personally spent 50 percent of this time counseling and discussing diagnostic testing, evaluation, treatment options, and management .       During this visit the following were done:  Labs Reviewed [x]    Labs Ordered []    Radiology Reports Reviewed [x]    Radiology Ordered []    PCP Records Reviewed [x]    Referring Provider Records Reviewed []    ER Records Reviewed []    Hospital Records Reviewed []    History Obtained From Family []    Radiology Images Reviewed [x]    Other Reviewed []    Records Requested []      ANGEL Mac  [facility]  [address] [facility] [address]  [facility] [place] [address] [phone]

## 2023-12-03 DIAGNOSIS — J01.00 ACUTE NON-RECURRENT MAXILLARY SINUSITIS: Primary | ICD-10-CM

## 2023-12-03 RX ORDER — AMOXICILLIN AND CLAVULANATE POTASSIUM 875; 125 MG/1; MG/1
1 TABLET, FILM COATED ORAL 2 TIMES DAILY
Qty: 20 TABLET | Refills: 0 | Status: SHIPPED | OUTPATIENT
Start: 2023-12-03 | End: 2023-12-13

## 2023-12-05 ENCOUNTER — TELEPHONE (OUTPATIENT)
Dept: FAMILY MEDICINE CLINIC | Facility: CLINIC | Age: 53
End: 2023-12-05
Payer: COMMERCIAL

## 2023-12-05 NOTE — TELEPHONE ENCOUNTER
Okay for the HUB to read:     Please call patient and make sure he is aware that I have sent in an antibiotic for him.  His CT of the head indicates that he has right-sided maxillary sinus infection.

## 2023-12-13 LAB — REF LAB TEST METHOD: NORMAL

## 2023-12-18 ENCOUNTER — HOSPITAL ENCOUNTER (OUTPATIENT)
Dept: ONCOLOGY | Facility: HOSPITAL | Age: 53
Discharge: HOME | End: 2023-12-18
Admitting: INTERNAL MEDICINE
Payer: COMMERCIAL

## 2023-12-18 ENCOUNTER — OFFICE VISIT (OUTPATIENT)
Dept: ONCOLOGY | Facility: CLINIC | Age: 53
End: 2023-12-18
Payer: COMMERCIAL

## 2023-12-18 VITALS
DIASTOLIC BLOOD PRESSURE: 75 MMHG | BODY MASS INDEX: 30.09 KG/M2 | WEIGHT: 227 LBS | TEMPERATURE: 98.1 F | HEIGHT: 73 IN | SYSTOLIC BLOOD PRESSURE: 121 MMHG | HEART RATE: 64 BPM | RESPIRATION RATE: 16 BRPM

## 2023-12-18 VITALS
HEIGHT: 73 IN | RESPIRATION RATE: 18 BRPM | HEART RATE: 78 BPM | OXYGEN SATURATION: 96 % | SYSTOLIC BLOOD PRESSURE: 124 MMHG | WEIGHT: 229 LBS | BODY MASS INDEX: 30.35 KG/M2 | TEMPERATURE: 98.3 F | DIASTOLIC BLOOD PRESSURE: 66 MMHG

## 2023-12-18 DIAGNOSIS — C95.10 CHRONIC LEUKEMIA, NOT HAVING ACHIEVED REMISSION: ICD-10-CM

## 2023-12-18 DIAGNOSIS — K90.9 IRON MALABSORPTION: Primary | ICD-10-CM

## 2023-12-18 DIAGNOSIS — C95.10 CHRONIC LEUKEMIA, NOT HAVING ACHIEVED REMISSION: Primary | ICD-10-CM

## 2023-12-18 DIAGNOSIS — D50.9 IRON DEFICIENCY ANEMIA, UNSPECIFIED IRON DEFICIENCY ANEMIA TYPE: ICD-10-CM

## 2023-12-18 DIAGNOSIS — D50.9 IRON DEFICIENCY ANEMIA, UNSPECIFIED IRON DEFICIENCY ANEMIA TYPE: Primary | ICD-10-CM

## 2023-12-18 PROCEDURE — 25810000003 SODIUM CHLORIDE 0.9 % SOLUTION: Performed by: INTERNAL MEDICINE

## 2023-12-18 PROCEDURE — 96374 THER/PROPH/DIAG INJ IV PUSH: CPT

## 2023-12-18 PROCEDURE — 96375 TX/PRO/DX INJ NEW DRUG ADDON: CPT

## 2023-12-18 PROCEDURE — 99214 OFFICE O/P EST MOD 30 MIN: CPT | Performed by: INTERNAL MEDICINE

## 2023-12-18 PROCEDURE — 25010000002 FERUMOXYTOL 510 MG/17ML SOLUTION 17 ML VIAL: Performed by: INTERNAL MEDICINE

## 2023-12-18 PROCEDURE — 63710000001 DIPHENHYDRAMINE PER 50 MG: Performed by: INTERNAL MEDICINE

## 2023-12-18 RX ORDER — DIPHENHYDRAMINE HCL 25 MG
25 CAPSULE ORAL ONCE
Status: COMPLETED | OUTPATIENT
Start: 2023-12-18 | End: 2023-12-18

## 2023-12-18 RX ORDER — FAMOTIDINE 10 MG/ML
20 INJECTION, SOLUTION INTRAVENOUS ONCE
Status: COMPLETED | OUTPATIENT
Start: 2023-12-18 | End: 2023-12-18

## 2023-12-18 RX ORDER — SODIUM CHLORIDE 9 MG/ML
20 INJECTION, SOLUTION INTRAVENOUS ONCE
Status: COMPLETED | OUTPATIENT
Start: 2023-12-18 | End: 2023-12-18

## 2023-12-18 RX ADMIN — SODIUM CHLORIDE 20 ML/HR: 9 INJECTION, SOLUTION INTRAVENOUS at 09:19

## 2023-12-18 RX ADMIN — FERUMOXYTOL 510 MG: 510 INJECTION INTRAVENOUS at 09:56

## 2023-12-18 RX ADMIN — FAMOTIDINE 20 MG: 10 INJECTION, SOLUTION INTRAVENOUS at 09:23

## 2023-12-18 RX ADMIN — DIPHENHYDRAMINE HYDROCHLORIDE 25 MG: 25 CAPSULE ORAL at 09:11

## 2023-12-18 NOTE — PROGRESS NOTES
"DATE OF VISIT: 12/18/2023    REASON FOR VISIT: Followup for Chronic lymphocytic leukemia, Da Silva stage 0     PROBLEM LIST:  1.  Chronic lymphocytic leukemia, Da Silva stage 0:  A.  Presented with fatigue, weakness unexplained weight loss September 2023  B.  CBC checked twice November 2023 revealed white cells of 17,000 and absolute lymphocyte count more than 8000.  C.  Peripheral blood flow cytometry confirmed clonal B cells CD5 and CD23 positive.  D.  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  2.  Microcytic anemia  3.  Fatigue    HISTORY OF PRESENT ILLNESS: The patient is a very pleasant 53 y.o. male  with past medical history significant for CLL diagnosed December 2023. The  patient is here today for scheduled follow-up visit    SUBJECTIVE: The patient is here today by himself.  He is complaining of fatigue.  He is anxious about the blood work results.    Past History:  Medical History: has no past medical history on file.   Surgical History: has no past surgical history on file.   Family History: family history includes Dementia in his mother; Heart disease in his father.   Social History: reports that he has never smoked. He has never been exposed to tobacco smoke. He has never used smokeless tobacco. He reports that he does not currently use alcohol after a past usage of about 1.0 standard drink of alcohol per week. He reports current drug use. Drug: Marijuana.    (Not in a hospital admission)     Allergies: Patient has no known allergies.     Review of Systems   Constitutional:  Positive for fatigue and unexpected weight change.   Musculoskeletal:  Positive for arthralgias.   Psychiatric/Behavioral:  The patient is nervous/anxious.        PHYSICAL EXAMINATION:   /66   Pulse 78   Temp 98.3 °F (36.8 °C) (Temporal)   Resp 18   Ht 185.4 cm (72.99\")   Wt 104 kg (229 lb)   SpO2 96%   BMI 30.22 kg/m²    Pain Score    12/18/23 0800   PainSc: 0-No pain    "     ECOG score: 0            ECOG Performance Status: 1 - Symptomatic but completely ambulatory      General Appearance:      alert, cooperative, no apparent distress and appears stated age   Lungs:   Clear to auscultation bilaterally; respirations regular, even, and unlabored bilaterally   Heart:  Regular rate and rhythm, no murmurs appreciated   Abdomen:   Soft, non-tender, non-distended, and no organomegaly                 No visits with results within 2 Week(s) from this visit.   Latest known visit with results is:   Lab on 11/27/2023   Component Date Value Ref Range Status    Iron 11/27/2023 20 (L)  59 - 158 mcg/dL Final    Iron Saturation (TSAT) 11/27/2023 4 (L)  20 - 50 % Final    Transferrin 11/27/2023 341  200 - 360 mg/dL Final    TIBC 11/27/2023 508  298 - 536 mcg/dL Final    Ferritin 11/27/2023 8.23 (L)  30.00 - 400.00 ng/mL Final    Folate 11/27/2023 7.39  4.78 - 24.20 ng/mL Final    Vitamin B-12 11/27/2023 547  211 - 946 pg/mL Final    LDH 11/27/2023 180  135 - 225 U/L Final    IgVH Result 11/27/2023 Comment^Text^TXT   Final    IgVH Somatic Hypermutation was not detected.  DISCLAIMER: REFER TO HARDCOPY OR PDF FOR COMPLETE RESULT.   If synopsis provided, clinical decisions should not be   based on this interfaced synopsis alone.  Performed at:  1 - [facility]  [address], [place] [id number]  :  Candida Moraes M.D., Ph.D., Phone:  [phone]    WBC 11/27/2023 15.20 (H)  3.40 - 10.80 10*3/mm3 Final    RBC 11/27/2023 4.93  4.14 - 5.80 10*6/mm3 Final    Hemoglobin 11/27/2023 10.9 (L)  13.0 - 17.7 g/dL Final    Hematocrit 11/27/2023 36.0 (L)  37.5 - 51.0 % Final    MCV 11/27/2023 73.0 (L)  79.0 - 97.0 fL Final    MCH 11/27/2023 22.1 (L)  26.6 - 33.0 pg Final    MCHC 11/27/2023 30.3 (L)  31.5 - 35.7 g/dL Final    RDW 11/27/2023 15.0  12.3 - 15.4 % Final    RDW-SD 11/27/2023 40.0  37.0 - 54.0 fl Final    MPV 11/27/2023 10.0  6.0 - 12.0 fL Final     Platelets 11/27/2023 239  140 - 450 10*3/mm3 Final    Neutrophil % 11/27/2023 31.5 (L)  42.7 - 76.0 % Final    Lymphocyte % 11/27/2023 57.0 (H)  19.6 - 45.3 % Final    Monocyte % 11/27/2023 9.8  5.0 - 12.0 % Final    Eosinophil % 11/27/2023 1.1  0.3 - 6.2 % Final    Basophil % 11/27/2023 0.5  0.0 - 1.5 % Final    Immature Grans % 11/27/2023 0.1  0.0 - 0.5 % Final    Neutrophils, Absolute 11/27/2023 4.78  1.70 - 7.00 10*3/mm3 Final    Lymphocytes, Absolute 11/27/2023 8.67 (H)  0.70 - 3.10 10*3/mm3 Final    Monocytes, Absolute 11/27/2023 1.49 (H)  0.10 - 0.90 10*3/mm3 Final    Eosinophils, Absolute 11/27/2023 0.16  0.00 - 0.40 10*3/mm3 Final    Basophils, Absolute 11/27/2023 0.08  0.00 - 0.20 10*3/mm3 Final    Immature Grans, Absolute 11/27/2023 0.02  0.00 - 0.05 10*3/mm3 Final    Consult Global Result 11/27/2023 Comment^Text^TXT   Final    Peripheral Blood:  Abnormal CD5+ B-cell population (47% of leukocytes) with a   B-cell chronic lymphocytic leukemia/small lymphocytic   lymphoma (CLL/SLL) immunophenotype  DISCLAIMER: REFER TO HARDCOPY OR PDF FOR COMPLETE RESULT.   If synopsis provided, clinical decisions should not be   based on this interfaced synopsis alone.  Performed at:  [address] [place] [address] [facility]  [address] [place] [address] [address]  [address]  :  Candida Moraes M.D., Ph.D., Phone:  [phone]        CT Head Without Contrast    Result Date: 12/1/2023  Narrative: CT HEAD WO CONTRAST Date of Exam: 12/1/2023 8:50 AM EST Indication: Mental status change, unknown cause. Comparison: None available. Technique: Axial CT images were obtained of the head without contrast administration.  Automated exposure control and iterative construction methods were used. Findings: There is no underlying hemorrhage. There is no midline shift. There are no abnormal extra-axial fluid collections. There is right maxillary sinus disease. There is some increased attenuation to density within the sinus  that may relate to inspissated mucus or sequela of a fungal infection. There is no definite orbital abnormality.     Impression: Impression: 1.No acute intracranial abnormality. 2.Right maxillary sinus disease. Electronically Signed: Alessandro Crowder MD  12/1/2023 9:01 AM EST  Workstation ID: [id number]     ASSESSMENT: The patient is a very pleasant 53 y.o. male  with chronic lymphocytic leukemia      PLAN:    1.  Chronic lymphocytic leukemia, Da Silva stage 0:  A.  I did go over the blood work results with the patient.  I explained to him it was most consistent with CLL.  B.  I did go over the molecular profile analysis.  The patient does have high risk features including 11 q. deletion as well as unmutated Ig VH gene.  C.  Given the fact the patient is fairly asymptomatic, no B symptoms, no bulky lymphadenopathy, his anemia is mild and platelet counts are normal we will hold off on therapy for now.  D.  He will follow-up with me in 3 months with repeat CBC with differential.  E.  I will check his quantitative immunoglobin level down the road.    2.  Iron deficiency:  A.  The patient be treated with IV iron infusions.  B.  He will follow-up with me in 3 months with repeated iron profile.  C.  I will refer him to see GI for colonoscopy.  This negative will do endoscopy as well as capsule enteroscopy.    FOLLOW UP: 3 months with CBC, quantitative immunoglobulin and iron profile.    Robby Panchal MD  12/18/2023

## 2023-12-26 ENCOUNTER — HOSPITAL ENCOUNTER (OUTPATIENT)
Dept: ONCOLOGY | Facility: HOSPITAL | Age: 53
Discharge: HOME | End: 2023-12-26
Admitting: INTERNAL MEDICINE
Payer: COMMERCIAL

## 2023-12-26 VITALS
RESPIRATION RATE: 16 BRPM | SYSTOLIC BLOOD PRESSURE: 123 MMHG | HEART RATE: 71 BPM | BODY MASS INDEX: 30.48 KG/M2 | TEMPERATURE: 98 F | DIASTOLIC BLOOD PRESSURE: 68 MMHG | HEIGHT: 73 IN | WEIGHT: 230 LBS

## 2023-12-26 DIAGNOSIS — K90.9 IRON MALABSORPTION: Primary | ICD-10-CM

## 2023-12-26 DIAGNOSIS — D50.9 IRON DEFICIENCY ANEMIA, UNSPECIFIED IRON DEFICIENCY ANEMIA TYPE: ICD-10-CM

## 2023-12-26 PROCEDURE — 96375 TX/PRO/DX INJ NEW DRUG ADDON: CPT

## 2023-12-26 PROCEDURE — 25810000003 SODIUM CHLORIDE 0.9 % SOLUTION: Performed by: INTERNAL MEDICINE

## 2023-12-26 PROCEDURE — 63710000001 DIPHENHYDRAMINE PER 50 MG: Performed by: INTERNAL MEDICINE

## 2023-12-26 PROCEDURE — 25010000002 FERUMOXYTOL 510 MG/17ML SOLUTION 17 ML VIAL: Performed by: INTERNAL MEDICINE

## 2023-12-26 PROCEDURE — 96374 THER/PROPH/DIAG INJ IV PUSH: CPT

## 2023-12-26 RX ORDER — SODIUM CHLORIDE 9 MG/ML
20 INJECTION, SOLUTION INTRAVENOUS ONCE
Status: COMPLETED | OUTPATIENT
Start: 2023-12-26 | End: 2023-12-26

## 2023-12-26 RX ORDER — DIPHENHYDRAMINE HCL 25 MG
25 CAPSULE ORAL ONCE
Status: COMPLETED | OUTPATIENT
Start: 2023-12-26 | End: 2023-12-26

## 2023-12-26 RX ORDER — FAMOTIDINE 10 MG/ML
20 INJECTION, SOLUTION INTRAVENOUS ONCE
Status: COMPLETED | OUTPATIENT
Start: 2023-12-26 | End: 2023-12-26

## 2023-12-26 RX ADMIN — FAMOTIDINE 20 MG: 10 INJECTION, SOLUTION INTRAVENOUS at 12:55

## 2023-12-26 RX ADMIN — SODIUM CHLORIDE 20 ML/HR: 9 INJECTION, SOLUTION INTRAVENOUS at 12:55

## 2023-12-26 RX ADMIN — DIPHENHYDRAMINE HYDROCHLORIDE 25 MG: 25 CAPSULE ORAL at 12:54

## 2023-12-26 RX ADMIN — FERUMOXYTOL 510 MG: 510 INJECTION INTRAVENOUS at 13:07

## 2025-08-20 ENCOUNTER — NURSE TRIAGE (OUTPATIENT)
Dept: CALL CENTER | Facility: HOSPITAL | Age: 55
End: 2025-08-20
Payer: COMMERCIAL